# Patient Record
(demographics unavailable — no encounter records)

---

## 2018-01-09 NOTE — PHYS DOC
Past History


Past Medical History:  Cancer, Diabetes, Hypertension


Past Surgical History:  Other


Alcohol Use:  None


Drug Use:  None





Adult General


Chief Complaint


Chief Complaint:  DIZZY/LIGHT HEADED





HPI


HPI





Patient is a 72 year old M who presents with vertigo over the past 3-4 days.  

His symptoms are worse when he moves his head quickly and improved with rest. 

He also feels that his symptoms are improved with meclizine. He has had 

previous episodes similar to this approximately every 6-7 years. He did have 

the flu approximately 3 weeks ago. He has no other associated symptoms at this 

time. He has no other exacerbating or alleviating factors.





Review of Systems


Review of Systems





Constitutional: Denies fever or chills []


Eyes: Denies change in visual acuity, redness, or eye pain []


HENT: Denies nasal congestion or sore throat []


Respiratory: Denies cough or shortness of breath []


Cardiovascular: No additional information not addressed in HPI []


GI: Denies abdominal pain, nausea, vomiting, bloody stools or diarrhea []


: Denies dysuria or hematuria []


Musculoskeletal: Denies back pain or joint pain []


Integument: Denies rash or skin lesions []


Neurologic: Denies headache, focal weakness or sensory changes []


Endocrine: Denies polyuria or polydipsia []





All other systems were reviewed and found to be within normal limits, except as 

documented in this note.





Family History


Family History


No pertinent family medical history reported





Current Medications


Current Medications


Current medications were reviewed





Allergies


Allergies





Allergies








Coded Allergies Type Severity Reaction Last Updated Verified


 


  No Known Drug Allergies    12/26/15 No











Physical Exam


Physical Exam





Constitutional: Well developed, well nourished, no acute distress, non-toxic 

appearance. []


HENT: Normocephalic, atraumatic, bilateral external ears normal, oropharynx 

moist, no oral exudates, nose normal. []


Eyes:  EOMI, conjunctiva normal, no discharge. [] 


Neck: Normal range of motion, no tenderness, supple, no stridor. [] 


Cardiovascular:Heart rate regular rhythm, 


Lungs & Thorax:  Bilateral breath sounds clear to auscultation []


Abdomen: Bowel sounds normal, soft, no tenderness, no masses, no pulsatile 

masses. [] 


Skin: Warm, dry, no erythema, no rash. [] 


Extremities: No tenderness, no cyanosis, no clubbing, ROM intact, no edema. [] 


Neurologic: Alert and oriented X 3, normal motor function, normal sensory 

function, no focal deficits noted. []


Psychologic: Affect normal, judgement normal, mood normal. []





Current Patient Data


Vital Signs





 Vital Signs








  Date Time  Temp Pulse Resp B/P (MAP) Pulse Ox O2 Delivery O2 Flow Rate FiO2


 


1/9/18 11:30 97.9 79 16  100 Room Air  











EKG


EKG


[]





Radiology/Procedures


Radiology/Procedures


[]





Course & Med Decision Making


Course & Med Decision Making


Pertinent Labs and Imaging studies reviewed. (See chart for details)





[]





Dragon Disclaimer


Dragon Disclaimer


This electronic medical record was generated, in whole or in part, using a 

voice recognition dictation system.





Departure


Departure:


Impression:  


 Primary Impression:  


 Vertigo


Disposition:  01 HOME, SELF-CARE


Condition:  STABLE


Referrals:  


ANGELA CORBIN MD (PCP)


Patient Instructions:  Benign Positional Vertigo, Vertigo





Additional Instructions:  


Krish was seen in the emergency department for dizziness. No emergency medical 

condition was found on history or physical exam. His symptoms are most 

consistent with labyrinthitis however benign paroxysmal positional vertigo was 

considered. He was advised to use ibuprofen as needed as well as meclizine as 

needed for his dizziness. He is advised follow-up with his primary care doctor 

as needed. He was also advised return to the emergency room if he develops new 

or worsening symptoms.


Scripts


Meclizine Hcl (MECLIZINE HCL) 25 Mg Tablet


1 TAB PO PRN TID for DIZZINESS for 7 Days, #20 TAB


   Prov: LAVERNE DOUGLAS MD         1/9/18











LAVERNE DOUGLAS MD Jan 9, 2018 12:24

## 2018-01-09 NOTE — EKG
[facility] [address], [place] [address]

Test Date:    2018               Test Time:    11:45:57

Pat Name:     MAINOR ARCHIBALD            Department:   

Patient ID:   [id number]           Room:          

Gender:       M                        Technician:   VANI

:          1945               Requested By: LAVERNE DOUGLAS

Order Number: [id number]            Reading MD:   Rock Ron MD

                                 Measurements

Intervals                              Axis          

Rate:         71                       P:            38

KS:           170                      QRS:          -27

QRSD:         130                      T:            -2

QT:           394                                    

QTc:          428                                    

                           Interpretive Statements

SINUS RHYTHM

LEFTWARD AXIS

RIGHT BUNDLE BRANCH BLOCK



Electronically Signed On 2018 12:54:07 CST by Rock Ron MD
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
## 2020-10-04 NOTE — RAD
Exam: CT abdomen/pelvis without intravenous contrast

 

Indication: Hematuria

 

Comparison: None

 

Technique: Helical CT imaging performed of the abdomen and pelvis without 

the use of intravenous contrast. Sagittal and coronal reformats were 

obtained. 

 

One or more of the following individualized dose reduction techniques were

utilized for this examination:  

 

1. Automated exposure control

2. Adjustment of the mA and/or kV according to patient size

3. Use of iterative reconstruction technique.

 

Findings:

 

Inherently limited evaluation without intravenous contrast.

 

Lower chest: Lung bases are clear. The heart is normal in size.

 

Liver: There multiple simple cysts in liver, the largest measuring 4 cm. 

The left hepatic lobe is small.

 

Gallbladder/Biliary Tree: Normal.

 

Pancreas: Normal.

 

Spleen: Normal.

 

Adrenal Glands: Normal.

 

Kidneys/Ureters/Bladder: Kidneys are normal in size. There are 2 simple 

cysts in the superior left renal pole measuring up to 2 cm. There is 

bilateral nephrolithiasis with punctate calculi. Ureters are normal where 

visualized. There is masslike irregular bladder wall thickening on the 

right measuring approximately 2.8 x 2.0 cm. Additional bladder wall 

thickening anteriorly. There is mild fat stranding around the bladder.

 

Reproductive Organs: There are calcifications in the prostate gland. 

 

Stomach, small bowel, and colon: Normal.

 

Vasculature: Abdominal aorta is normal in caliber. Mild calcified 

aortoiliac atherosclerosis.

 

Lymph Nodes: There is a 6 mm short axis right pelvic sidewall lymph node 

adjacent to the bladder (image 129, series 2) additional right pelvic 

sidewall lymph node measuring 4 mm short axis (image 133, series 2)

 

Peritoneum and retroperitoneum: No free fluid or free air. 

 

Bones: There is surgical changes of posterior decompression and fusion at 

L4-L5. Moderate to severe degenerative disc disease throughout the lumbar 

spine. There is thoracolumbar scoliosis. Old bilateral inferior pubic rami

fractures. 

 

Miscellaneous: There is heterotopic ossification in the right rectus 

femoris muscle.

 

Impression:

 

1.  Irregular masslike bladder wall thickening with surrounding fat 

stranding. This is concerning for malignancy although cystitis is also 

possible. Recommend CT with and without contrast with delayed phase 

imaging to further evaluate.

 

2.  There are 2 prominent right pelvic sidewall lymph nodes adjacent the 

bladder.

 

3.  Bilateral nephrolithiasis with punctate calculi.

 

Electronically signed by: Denia Thao MD (10/4/2020 5:24 PM) [id number]

## 2020-10-04 NOTE — PHYS DOC
Past History


Past Medical History:  Cancer, Diabetes, Hypertension


Past Surgical History:  Cancer Surgery, Other


Alcohol Use:  None


Drug Use:  None





General Adult


EDM:


Chief Complaint:  PAIN ON URINATION





HPI:


HPI:





History obtained from the patient.  Patient is a 75-year-old male with history 

of high blood pressure and prostatic cancer who presents with dysuria.  Patient 

states he has had the symptoms for the past 2 days.  He states that he has 

noticed intermittent blood in his urine as well.  He states he has had urination

that is been normal however.  Denies any testicle pain.  Denies abdominal pain. 

Denies any back pain.  States he was on Bactrim approximately 1 month ago for 

swollen gland near his scrotum.  He is unsure exactly what he was being treated 

for.  He notes that he has history of bladder sling surgery several years ago.  

Denies allergies to antibiotics.  Denies vomiting.  Denies any objective fevers.

 Denies any penile discharge.  Denies current sexual activity.  States he has no

pain related complaints other than some dysuria and intermittent hematuria.  

Denies any history of kidney stone.





Review of Systems:


Review of Systems:


Constitutional:  Denies fever or chills 


Eyes:  Denies change in visual acuity 


HENT:  Denies nasal congestion or sore throat 


Respiratory:  Denies cough or shortness of breath 


Cardiovascular:  Denies chest pain or edema 


GI:  Denies abdominal pain, nausea, vomiting, bloody stools or diarrhea 


: Positive for dysuria and hematuria.


Musculoskeletal:  Denies back pain or joint pain 


Integument:  Denies rash 


Neurologic:  Denies headache, focal weakness or sensory changes 


Endocrine:  Denies polyuria or polydipsia 


Lymphatic:  Denies swollen glands 


Psychiatric:  Denies depression or anxiety





Heart Score:


Risk Factors:


Risk Factors:  DM, Current or recent (<one month) smoker, HTN, HLP, family 

history of CAD, obesity.


Risk Scores:


Score 0 - 3:  2.5% MACE over next 6 weeks - Discharge Home


Score 4 - 6:  20.3% MACE over next 6 weeks - Admit for Clinical Observation


Score 7 - 10:  72.7% MACE over next 6 weeks - Early Invasive Strategies





Allergies:


Allergies:





Allergies








Coded Allergies Type Severity Reaction Last Updated Verified


 


  No Known Drug Allergies    12/26/15 No











Physical Exam:


PE:





Constitutional: Well developed, well nourished, no acute distress, non-toxic 

appearance. []


HENT: Normocephalic, atraumatic, bilateral external ears normal, oropharynx 

moist, no oral exudates, nose normal. []


Eyes: PERRLA, EOMI, conjunctiva normal, no discharge. [] 


Neck: Normal range of motion, no tenderness, supple, no stridor. [] 


Cardiovascular:Heart rate regular rhythm, no murmur []


Lungs & Thorax:  Bilateral breath sounds clear to auscultation []


Abdomen: Soft, nontender, nonacute abdomen.  No involuntary guarding or rigidity

 noted.  No acute peritonitis.


: Uncircumcised.  No signs of balanitis.  Testicles without tenderness to 

palpation.  2, 2mm puncture appearing wounds to the medial aspect of the thighs 

bilaterally where it meets the scrotum.  Consistent with post bladder sling 

surgery.  No surrounding erythema.  No purulent drainage noted.  No palpable 

fluctuance.  No crepitus noted.


Skin: Warm, dry, no erythema, no rash. [] 


Back: No tenderness, no CVA tenderness. [] 


Extremities: No tenderness, no cyanosis, no clubbing, ROM intact, no edema. [] 


Neurologic: Alert and oriented X 3, normal motor function, normal sensory 

function, no focal deficits noted. []


Psychologic: Affect normal, judgement normal, mood normal. []





Current Patient Data:


Labs:





Laboratory Tests








Test


 10/4/20


16:01 10/4/20


16:45


 


Urine Collection Type Unknown  


 


Urine Color Yellow  


 


Urine Clarity Clear  


 


Urine pH 6.0  


 


Urine Specific Gravity >=1.030  


 


Urine Protein Neg  


 


Urine Glucose (UA) Neg mg/dL  


 


Urine Ketones (Stick) Neg mg/dL  


 


Urine Blood Mod  


 


Urine Nitrite Neg  


 


Urine Bilirubin Neg  


 


Urine Urobilinogen Dipstick 0.2 mg/dL  


 


Urine Leukocyte Esterase Neg  


 


Urine RBC 20-40 /HPF  


 


Urine WBC 1-4 /HPF  


 


Urine Squamous Epithelial


Cells None /LPF 


 





 


Urine Bacteria 0 /HPF  


 


White Blood Count  3.9 x10^3/uL 


 


Red Blood Count  4.14 x10^6/uL 


 


Hemoglobin  10.8 g/dL 


 


Hematocrit  34.1 % 


 


Mean Corpuscular Volume  83 fL 


 


Mean Corpuscular Hemoglobin  26 pg 


 


Mean Corpuscular Hemoglobin


Concent 


 32 g/dL 





 


Red Cell Distribution Width  16.4 % 


 


Platelet Count  225 x10^3/uL 


 


Neutrophils (%) (Auto)  58 % 


 


Lymphocytes (%) (Auto)  28 % 


 


Monocytes (%) (Auto)  10 % 


 


Eosinophils (%) (Auto)  3 % 


 


Basophils (%) (Auto)  1 % 


 


Neutrophils # (Auto)  2.3 x10^3uL 


 


Lymphocytes # (Auto)  1.1 x10^3/uL 


 


Monocytes # (Auto)  0.4 x10^3/uL 


 


Eosinophils # (Auto)  0.1 x10^3/uL 


 


Basophils # (Auto)  0.1 x10^3/uL 


 


Sodium Level  140 mmol/L 


 


Potassium Level  4.2 mmol/L 


 


Chloride Level  105 mmol/L 


 


Carbon Dioxide Level  28 mmol/L 


 


Anion Gap  7 


 


Blood Urea Nitrogen  23 mg/dL 


 


Creatinine  1.1 mg/dL 


 


Estimated GFR


(Cockcroft-Gault) 


 79.0 





 


Glucose Level  88 mg/dL 


 


Calcium Level  9.3 mg/dL 











EKG:


EKG:


[]





Radiology/Procedures:


Radiology/Procedures:


[facility] [address], [place] [address]


                                 [phone]


                                        


                                 IMAGING REPORT





                                     Signed





PATIENT: MAINOR ARCHIBALD  ACCOUNT: [id number]     MRN#: [id number]


: 1945           LOCATION: ER              AGE: 75


SEX: M                    EXAM DT: 10/04/20         ACCESSION#: [id number]


STATUS: REG ER            ORD. PHYSICIAN: TESHA HOOD DO


REASON: hematuria


PROCEDURE: CT ABDOMEN PELVIS WO CONTRAST





Exam: CT abdomen/pelvis without intravenous contrast


 


Indication: Hematuria


 


Comparison: None


 


Technique: Helical CT imaging performed of the abdomen and pelvis without 


the use of intravenous contrast. Sagittal and coronal reformats were 


obtained. 


 


One or more of the following individualized dose reduction techniques were


utilized for this examination:  


 


1. Automated exposure control


2. Adjustment of the mA and/or kV according to patient size


3. Use of iterative reconstruction technique.


 


Findings:


 


Inherently limited evaluation without intravenous contrast.


 


Lower chest: Lung bases are clear. The heart is normal in size.


 


Liver: There multiple simple cysts in liver, the largest measuring 4 cm. 


The left hepatic lobe is small.


 


Gallbladder/Biliary Tree: Normal.


 


Pancreas: Normal.


 


Spleen: Normal.


 


Adrenal Glands: Normal.


 


Kidneys/Ureters/Bladder: Kidneys are normal in size. There are 2 simple 


cysts in the superior left renal pole measuring up to 2 cm. There is 


bilateral nephrolithiasis with punctate calculi. Ureters are normal where 


visualized. There is masslike irregular bladder wall thickening on the 


right measuring approximately 2.8 x 2.0 cm. Additional bladder wall 


thickening anteriorly. There is mild fat stranding around the bladder.


 


Reproductive Organs: There are calcifications in the prostate gland. 


 


Stomach, small bowel, and colon: Normal.


 


Vasculature: Abdominal aorta is normal in caliber. Mild calcified 


aortoiliac atherosclerosis.


 


Lymph Nodes: There is a 6 mm short axis right pelvic sidewall lymph node 


adjacent to the bladder (image 129, series 2) additional right pelvic 


sidewall lymph node measuring 4 mm short axis (image 133, series 2)


 


Peritoneum and retroperitoneum: No free fluid or free air. 


 


Bones: There is surgical changes of posterior decompression and fusion at 


L4-L5. Moderate to severe degenerative disc disease throughout the lumbar 


spine. There is thoracolumbar scoliosis. Old bilateral inferior pubic rami


fractures. 


 


Miscellaneous: There is heterotopic ossification in the right rectus 


femoris muscle.


 


Impression:


 


1.  Irregular masslike bladder wall thickening with surrounding fat 


stranding. This is concerning for malignancy although cystitis is also 


possible. Recommend CT with and without contrast with delayed phase 


imaging to further evaluate.


 


2.  There are 2 prominent right pelvic sidewall lymph nodes adjacent the 


bladder.


 


3.  Bilateral nephrolithiasis with punctate calculi.


 


Electronically signed by: Denia Thao MD (10/4/2020 5:24 PM) [id number]














DICTATED AND SIGNED BY:     DENIA THAO MD


DATE:     10/04/20 1724





CC: ANGELA CORBIN MD; TESHA HOOD DO ~


[]





Course & Med Decision Making:


Course & Med Decision Making


Pertinent Labs and Imaging studies reviewed. (See chart for details)





[] Patient is a well-appearing 75-year-old male who presents with chief 

complaint of some dysuria and blood in his urine.  He denies any pain whatsoever

 including abdominal or back pain.  He states he has no difficulty in urination.

  Initial vital signs unremarkable.  Urinalysis does show some hematuria but no 

signs of infection.  Antibiotics will be deferred.  Basic labs were obtained 

were grossly unremarkable.  Kidney function normal.  Hemoglobin 10.8.  I did 

discuss results of labs and imaging in great detail with the patient.  He states

 that he has been diagnosed with prostatic cancer and is status post pr

ostatectomy.  He notes that he was instructed by his urologist to follow-up with

 an oncologist.  I did instruct him to continue to follow-up with his urologist 

regarding her abnormal CT findings.  No emergent indication for hospitalization 

at this point.  Return precautions were discussed and understood.  Patient 

states he will follow-up with his urologist in the near future.  Instructed to 

follow-up with his primary care physician the next 2 to 3 days.





Dragon Disclaimer:


Dragon Disclaimer:


This electronic medical record was generated, in whole or in part, using a voice

 recognition dictation system.





Departure


Departure:


Impression:  


   Primary Impression:  


   Hematuria


   Qualified Codes:  R31.9 - Hematuria, unspecified


   Additional Impression:  


   Mass of urinary bladder


Disposition:   HOME/RESIDENCE PRIOR TO ADM


Condition:  STABLE


Referrals:  


ANGELA CORBIN MD (PCP)








ELISHA CARRILLO MD


Patient Instructions:  Prostate Cancer





Additional Instructions:  


Please follow-up with your urologist this week.











TESHA HOOD DO           Oct 4, 2020 16:12

## 2021-11-09 NOTE — RAD
EXAM: CT Head without IV contrast



CLINICAL HISTORY: Reason: MEMORY LOSS, TIA / Spl. Instructions:  / History: 



COMPARISON: None.



TECHNIQUE:

Routine CT of the head without contrast.



PQRS compliance statement - One or more of the following individualized dose reduction techniques wer
e utilized for this study:

1.  Automated exposure control

2.  Adjustment of the mA and/or kV according to patient size

3.  Use of iterative reconstruction technique



FINDINGS:  

There is no evidence of hemorrhage, mass or extra-axial fluid collection.



Grey-white differentiation is maintained with no evidence of edema. Subcortical and periventricular w
racheal matter foci of hypoattenuation likely changes of chronic small vessel disease.



There is no mass effect or shift of the intracranial structures.



The ventricles, basilar cisterns and cortical sulci are normal in size and configuration for the jose luis
ents stated age.



The cerebellum and brainstem are unremarkable.  



The calvarium demonstrates no evidence of fracture or focal lesion.



There is normal aeration of the visualized paranasal sinuses and mastoid air cells.



The visualized portions of the orbits are normal.



IMPRESSION:

1.  No evidence for acute intracranial process.

2.  White matter changes likely chronic small vessel disease.



Electronically signed by: Tito Villalobos MD (11/9/2021 10:21 AM) [email or web address]